# Patient Record
Sex: MALE | Race: BLACK OR AFRICAN AMERICAN | Employment: OTHER | URBAN - METROPOLITAN AREA
[De-identification: names, ages, dates, MRNs, and addresses within clinical notes are randomized per-mention and may not be internally consistent; named-entity substitution may affect disease eponyms.]

---

## 2021-04-08 DIAGNOSIS — Z23 ENCOUNTER FOR IMMUNIZATION: ICD-10-CM

## 2025-03-20 ENCOUNTER — APPOINTMENT (OUTPATIENT)
Dept: RADIOLOGY | Facility: MEDICAL CENTER | Age: 70
End: 2025-03-20
Payer: COMMERCIAL

## 2025-03-20 VITALS — HEIGHT: 72 IN | BODY MASS INDEX: 30.66 KG/M2 | WEIGHT: 226.4 LBS

## 2025-03-20 DIAGNOSIS — M25.561 RIGHT KNEE PAIN, UNSPECIFIED CHRONICITY: ICD-10-CM

## 2025-03-20 DIAGNOSIS — M17.11 PRIMARY OSTEOARTHRITIS OF RIGHT KNEE: Primary | ICD-10-CM

## 2025-03-20 DIAGNOSIS — Z01.89 ENCOUNTER FOR LOWER EXTREMITY COMPARISON IMAGING STUDY: ICD-10-CM

## 2025-03-20 PROCEDURE — 99204 OFFICE O/P NEW MOD 45 MIN: CPT | Performed by: ORTHOPAEDIC SURGERY

## 2025-03-20 PROCEDURE — 20610 DRAIN/INJ JOINT/BURSA W/O US: CPT | Performed by: ORTHOPAEDIC SURGERY

## 2025-03-20 PROCEDURE — 73560 X-RAY EXAM OF KNEE 1 OR 2: CPT

## 2025-03-20 PROCEDURE — 73564 X-RAY EXAM KNEE 4 OR MORE: CPT

## 2025-03-20 RX ORDER — TRIAMCINOLONE ACETONIDE 40 MG/ML
40 INJECTION, SUSPENSION INTRA-ARTICULAR; INTRAMUSCULAR
Status: COMPLETED | OUTPATIENT
Start: 2025-03-20 | End: 2025-03-20

## 2025-03-20 RX ORDER — AMLODIPINE BESYLATE 5 MG/1
5 TABLET ORAL
COMMUNITY
Start: 2025-02-11 | End: 2026-02-11

## 2025-03-20 RX ORDER — BUPIVACAINE HYDROCHLORIDE 2.5 MG/ML
2 INJECTION, SOLUTION INFILTRATION; PERINEURAL
Status: COMPLETED | OUTPATIENT
Start: 2025-03-20 | End: 2025-03-20

## 2025-03-20 RX ADMIN — BUPIVACAINE HYDROCHLORIDE 2 ML: 2.5 INJECTION, SOLUTION INFILTRATION; PERINEURAL at 09:30

## 2025-03-20 RX ADMIN — TRIAMCINOLONE ACETONIDE 40 MG: 40 INJECTION, SUSPENSION INTRA-ARTICULAR; INTRAMUSCULAR at 09:30

## 2025-03-20 NOTE — ASSESSMENT & PLAN NOTE
Patient has moderate right knee osteoarthritis.   Treatment options were discussed with patient at today's visit.    Injections: Patient received right knee steroid injection today. Tolerated the procedure well. Post injection instructions reviewed including information on glucose monitoring for diabetic patients. Patient aware that they may repeat steroid injection every 3 months if needed.   Medications: Tylenol up to 3000 mg per day patient limits NSAID use due to BP medication.   PT: Declined PT script. Patient may call if they would like a script placed in chart.   Bracing: Patient provided with short hinge knee brace to wear as needed for comfort.   Activity: Continue activity as tolerated.   Plan for next appt: Right knee CSI  Orders:    XR knee 4+ vw right injury; Future

## 2025-03-20 NOTE — PROGRESS NOTES
Name: Murray Hastings      : 1955      MRN: [id number]  Encounter Provider: Marisabel Esqueda DO  Encounter Date: 3/20/2025   Encounter department: [facility]  :  Assessment & Plan          Patient has {MILD, MOD, SEV:75357} {DESC; RIGHT/LEFT/BILATERAL:40436} knee osteoarthritis.   Treatment options were discussed with patient at today's visit.    Patient {is/is not:98809} a surgical candidate at this time.   Injections: Patient received {right/left/bilateral:} knee steroid injection today. Tolerated the procedure well. Post injection instructions reviewed including information on glucose monitoring for diabetic patients. Patient aware that they may repeat steroid injection every 3 months if needed.   Medications: {med choices:62039}  PT: {PT:95731}  Bracing: {bracing choices:04759}  Activity: {activity choices:37237}  Plan for next appt: {blank choice:90730}    No follow-ups on file.    I answered all of the patient's questions during the visit and provided education of the patient's condition during the visit.  The patient verbalized understanding of the information given and agrees with the plan.  This note was dictated using [employer] software.  It may contain errors including improperly dictated words.  Please contact physician directly for any questions.    History of Present Illness {?Quick Links Encounters * My Last Note * Last Note in Specialty * Snapshot * Since Last Visit * History :59556}  HPI  Chief complaint: No chief complaint on file.      HPI: Murray Hastings is a 69 y.o. male that c/o {right/left/bilateral:} knee pain.    Length of time knee pain has been present: ***  Any falls or trauma associated with onset of pain: ***  Location of pain: ***  Intermittent or constant: ***  Description of pain: ***  Aggravating factors: ***  Instability or locking: ***  Pain medication that has been tried: ***  Topical mediation that has been  tried: ***  Has heat/ice/elevation been tried: ***  Can NSAIDs be taken?  If not why?: ***  Has PT or home exercises been tried?: ***  Has bracing been tried? OTC or rx?  ***  Have injections been tried?  Steroid/visco?: ***  Any history of surgery on that knee?:  ***       ROS:    See HPI for musculoskeletal review.   All other systems reviewed are negative     Historical Information   No past medical history on file.  No past surgical history on file.  Social History   Social History     Substance and Sexual Activity   Alcohol Use Not on file     Social History     Substance and Sexual Activity   Drug Use Not on file     Social History     Tobacco Use   Smoking Status Not on file   Smokeless Tobacco Not on file     Family History: No family history on file.    No current outpatient medications on file prior to visit.     No current facility-administered medications on file prior to visit.     Not on File    No current outpatient medications on file prior to visit.     No current facility-administered medications on file prior to visit.         Objective {?Quick Links Trend Vitals * Enter New Vitals * Results Review * Timeline (Adult) * Labs * Imaging * Cardiology * Procedures * Lung Cancer Screening * Surgical eConsent :38311}  Ht 6' (1.829 m)   Wt 103 kg (226 lb 6.4 oz)   BMI 30.71 kg/m²        PE:  AAOx 3  WDWN  Hearing intact, no drainage from eyes  Regular rate  no audible wheezing  no abdominal distension  LE compartments soft, skin intact    Ortho Exam:  Right knee:    Appearance:  {YES/NO:200010} swelling   {YES/NO:200010} ecchymosis  {YES/NO:200010} obvious joint deformity   {YES/NO:200010} effusion   Palpation/Tenderness:  {YES/NO:200010} TTP over medial joint line  {YES/NO:200010} TTP over lateral joint line   {YES/NO:200010} TTP over patella  {YES/NO:200010} TTP over patellar tendon  {YES/NO:200010} TTP over pes anserine bursa  Active Range of Motion:  AROM: ***  Special Tests:  Valgus Stress Test:  "{POSITIVE/NEGATIVE:45672}  Varus Stress Test: {POSITIVE/NEGATIVE:21650}  Medial [facility]: {POSITIVE/NEGATIVE:95929}  Lateral [facility]: {POSITIVE/NEGATIVE:98239}  Lachman: {POSITIVE/NEGATIVE:87653}  Anterior/ Posterior Drawer: {POSITIVE/NEGATIVE:35376}    No ipsilateral hip pain with ROM    {right/left/bilateral:89699}LE:    Sensation grossly intact  Palpable *** pulse  AT/GS/EHL intact    Imaging Studies: {Results Review Statement:58104::\"No pertinent imaging studies reviewed.\"}  XR {right/left/bilateral:21223}knee:      Procedures     Scribe Attestation      I,:   am acting as a scribe while in the presence of the attending physician.:       I,:   personally performed the services described in this documentation    as scribed in my presence.:              "

## 2025-03-20 NOTE — PROGRESS NOTES
Name: Murray Hastings      : 1955      MRN: [id number]  Encounter Provider: Marisabel Esqueda DO  Encounter Date: 3/20/2025   [facility]  :  Assessment & Plan  Primary osteoarthritis of right knee  Patient has moderate right knee osteoarthritis.   Treatment options were discussed with patient at today's visit.    Injections: Patient received right knee steroid injection today. Tolerated the procedure well. Post injection instructions reviewed including information on glucose monitoring for diabetic patients. Patient aware that they may repeat steroid injection every 3 months if needed.   Medications: Tylenol up to 3000 mg per day patient limits NSAID use due to BP medication.   PT: Declined PT script. Patient may call if they would like a script placed in chart.   Bracing: Patient provided with short hinge knee brace to wear as needed for comfort.   Activity: Continue activity as tolerated.   Plan for next appt: Right knee CSI  Orders:    XR knee 4+ vw right injury; Future    Encounter for lower extremity comparison imaging study    Orders:    XR knee 1 or 2 vw left; Future        Return in about 3 months (around 2025).    I answered all of the patient's questions during the visit and provided education of the patient's condition during the visit.  The patient verbalized understanding of the information given and agrees with the plan.  This note was dictated using [employer] software.  It may contain errors including improperly dictated words.  Please contact physician directly for any questions.    History of Present Illness   HPI  Chief complaint:   Chief Complaint   Patient presents with    Right Knee - Pain       HPI: Murray Hastings is a 69 y.o. male that c/o right knee pain.    Length of time knee pain has been present: 3-4 weeks  Any falls or trauma associated with onset of pain: no shae  Location of pain: medial  Intermittent or  constant: intermittent  Description of pain: sharp and achy  Aggravating factors: squatting bending and kneeling  Instability or locking: no  Pain medication that has been tried: tylenol   Topical mediation that has been tried: no  Has heat/ice/elevation been tried: no  Can NSAIDs be taken?  If not why?: no on BP meds  Has PT or home exercises been tried?: no  Has bracing been tried? OTC or rx?  no  Have injections been tried?  Steroid/visco?: no  Any history of surgery on that knee?:  no       ROS:    See HPI for musculoskeletal review.   All other systems reviewed are negative     Historical Information   Past Medical History:   Diagnosis Date    Hypertension      History reviewed. No pertinent surgical history.  Social History   Social History     Substance and Sexual Activity   Alcohol Use None     Social History     Substance and Sexual Activity   Drug Use Not on file     Social History     Tobacco Use   Smoking Status Not on file   Smokeless Tobacco Not on file     Family History: No family history on file.    Current Outpatient Medications on File Prior to Visit   Medication Sig Dispense Refill    amLODIPine (NORVASC) 5 mg tablet Take 5 mg by mouth       No current facility-administered medications on file prior to visit.     Allergies   Allergen Reactions    Allopurinol Rash       Current Outpatient Medications on File Prior to Visit   Medication Sig Dispense Refill    amLODIPine (NORVASC) 5 mg tablet Take 5 mg by mouth       No current facility-administered medications on file prior to visit.         Objective   Ht 6' (1.829 m)   Wt 103 kg (226 lb 6.4 oz)   BMI 30.71 kg/m²        PE:  AAOx 3  WDWN  Hearing intact, no drainage from eyes  Regular rate  no audible wheezing  no abdominal distension  LE compartments soft, skin intact    rightknee:    Appearance:  no swelling   No bruising  no obvious joint deformity   No effusion  Palpation/Tenderness:  No TTP over medial joint line, no TTP over lateral joint  line or over patella/patellar tendon  Active Range of Motion:  AROM: 0-120  Special Tests:  Medial Vera's Test: Negative  Lateral Vera's Test:  Negative  Lachman's Test:  negative  Anterior Drawer Test:  negative  Valgus Stress Test:  negative  Varus Stress Test:  negative       Imaging Studies: Results Review Statement: I personally reviewed the following image studies in PACS and associated radiology reports: xray(s). My interpretation of the radiology images/reports is: Moderate right knee arthritis.    Large joint arthrocentesis: R knee  [place] Protocol:  procedure performed by consultantConsent: Verbal consent obtained.  Risks and benefits: risks, benefits and alternatives were discussed  Consent given by: patient  Patient understanding: patient states understanding of the procedure being performed  Site marked: the operative site was marked  Patient identity confirmed: verbally with patient  Supporting Documentation  Indications: pain   Procedure Details  Location: knee - R knee  Needle size: 22 G  Ultrasound guidance: no  Approach: anterolateral  Medications administered: 2 mL bupivacaine 0.25 %; 40 mg triamcinolone acetonide 40 mg/mL    Patient tolerance: patient tolerated the procedure well with no immediate complications  Dressing:  Sterile dressing applied           Scribe Attestation      I,:  Eldon Kirby am acting as a scribe while in the presence of the attending physician.:       I,:  Marisabel Esqueda DO personally performed the services described in this documentation    as scribed in my presence.:

## 2025-06-15 ENCOUNTER — TELEPHONE (OUTPATIENT)
Dept: OTHER | Facility: OTHER | Age: 70
End: 2025-06-15

## 2025-06-15 NOTE — TELEPHONE ENCOUNTER
Patient is calling regarding cancelling an appointment.    Date/Time:6/19/2025 8:15 am    Patient was rescheduled: YES [] NO [x]    Patient requesting call back to reschedule: YES [] NO [x]

## 2025-07-31 VITALS — BODY MASS INDEX: 30.77 KG/M2 | HEIGHT: 72 IN | WEIGHT: 227.2 LBS

## 2025-07-31 DIAGNOSIS — M17.11 PRIMARY OSTEOARTHRITIS OF RIGHT KNEE: Primary | ICD-10-CM

## 2025-07-31 DIAGNOSIS — M25.461 EFFUSION OF RIGHT KNEE: ICD-10-CM

## 2025-07-31 PROCEDURE — 20610 DRAIN/INJ JOINT/BURSA W/O US: CPT | Performed by: PHYSICIAN ASSISTANT

## 2025-07-31 PROCEDURE — 99214 OFFICE O/P EST MOD 30 MIN: CPT | Performed by: PHYSICIAN ASSISTANT

## 2025-07-31 RX ORDER — BUPIVACAINE HYDROCHLORIDE 2.5 MG/ML
2 INJECTION, SOLUTION INFILTRATION; PERINEURAL
Status: COMPLETED | OUTPATIENT
Start: 2025-07-31 | End: 2025-07-31

## 2025-07-31 RX ORDER — TRIAMCINOLONE ACETONIDE 40 MG/ML
40 INJECTION, SUSPENSION INTRA-ARTICULAR; INTRAMUSCULAR
Status: COMPLETED | OUTPATIENT
Start: 2025-07-31 | End: 2025-07-31

## 2025-07-31 RX ADMIN — BUPIVACAINE HYDROCHLORIDE 2 ML: 2.5 INJECTION, SOLUTION INFILTRATION; PERINEURAL at 10:00

## 2025-07-31 RX ADMIN — TRIAMCINOLONE ACETONIDE 40 MG: 40 INJECTION, SUSPENSION INTRA-ARTICULAR; INTRAMUSCULAR at 10:00
